# Patient Record
Sex: MALE | Race: WHITE | NOT HISPANIC OR LATINO | Employment: FULL TIME | URBAN - METROPOLITAN AREA
[De-identification: names, ages, dates, MRNs, and addresses within clinical notes are randomized per-mention and may not be internally consistent; named-entity substitution may affect disease eponyms.]

---

## 2017-10-04 ENCOUNTER — HOSPITAL ENCOUNTER (EMERGENCY)
Facility: HOSPITAL | Age: 47
Discharge: HOME | End: 2017-10-04
Attending: EMERGENCY MEDICINE
Payer: COMMERCIAL

## 2017-10-04 VITALS
RESPIRATION RATE: 19 BRPM | BODY MASS INDEX: 26.52 KG/M2 | SYSTOLIC BLOOD PRESSURE: 131 MMHG | WEIGHT: 175 LBS | HEART RATE: 93 BPM | OXYGEN SATURATION: 97 % | DIASTOLIC BLOOD PRESSURE: 92 MMHG | TEMPERATURE: 97.8 F | HEIGHT: 68 IN

## 2017-10-04 DIAGNOSIS — L50.9 URTICARIA: Primary | ICD-10-CM

## 2017-10-04 DIAGNOSIS — J06.9 VIRAL URI WITH COUGH: ICD-10-CM

## 2017-10-04 PROCEDURE — 99283 EMERGENCY DEPT VISIT LOW MDM: CPT

## 2017-10-04 RX ORDER — FAMOTIDINE 20 MG/1
20 TABLET, FILM COATED ORAL ONCE
Status: COMPLETED | OUTPATIENT
Start: 2017-10-04 | End: 2017-10-04

## 2017-10-04 RX ORDER — FAMOTIDINE 20 MG/1
20 TABLET, FILM COATED ORAL DAILY
Qty: 3 TABLET | Refills: 0 | Status: SHIPPED | OUTPATIENT
Start: 2017-10-04 | End: 2017-10-04

## 2017-10-04 RX ORDER — FAMOTIDINE 20 MG/1
20 TABLET, FILM COATED ORAL DAILY
Qty: 3 TABLET | Refills: 0 | Status: SHIPPED | OUTPATIENT
Start: 2017-10-04 | End: 2017-10-07

## 2017-10-04 RX ORDER — DIPHENHYDRAMINE HCL 25 MG
50 TABLET ORAL ONCE
Status: COMPLETED | OUTPATIENT
Start: 2017-10-04 | End: 2017-10-04

## 2017-10-04 RX ORDER — PREDNISONE 10 MG/1
50 TABLET ORAL DAILY
Qty: 20 TABLET | Refills: 0 | Status: SHIPPED | OUTPATIENT
Start: 2017-10-04 | End: 2017-10-08

## 2017-10-04 RX ADMIN — PREDNISONE 50 MG: 20 TABLET ORAL at 03:04

## 2017-10-04 RX ADMIN — DIPHENHYDRAMINE HCL 50 MG: 25 TABLET ORAL at 03:03

## 2017-10-04 RX ADMIN — FAMOTIDINE 20 MG: 20 TABLET ORAL at 03:03

## 2017-10-04 NOTE — ED ATTENDING ATTESTATION
Lauren Brown MD, saw and evaluated the patient  I have discussed the patient with the resident/non-physician practitioner and agree with the resident's/non-physician practitioner's findings, Plan of Care, and MDM as documented in the resident's/non-physician practitioner's note, except where noted  All available labs and Radiology studies were reviewed  At this point I agree with the current assessment done in the Emergency Department  I have conducted an independent evaluation of this patient including a focused history of:    Emergency Department Note- Jose Corado 40 y o  male MRN: [id number]    Unit/Bed#: [facility] 2 Encounter: [id number]    Jose Corado is a 40 y o  male who presents with   Chief Complaint   Patient presents with    Hand Swelling     Pt  reports, "Last night, I was itching and I noticed some irritation to my skin  Today, I noticed that my hands and arms are swollen  I'm not sure if it's in allergic reaction or what  I took a benadryl, but it didn't seem to help at all "  Pt  denies difficulty breathing/swelling  History of Present Illness   HPI:  Jose Corado is a 40 y o  male who presents for evaluation of:  Pruritic rash on hands and feet that started yesterday  Patient self medicated with diphenhydramine, but has had no resolution of the rash  Patient has not had any new foods, no h/o allergies  Patient had recent URI  Review of Systems   Constitutional: Negative for activity change and fever  HENT: Negative for facial swelling and trouble swallowing  Respiratory: Negative for chest tightness and shortness of breath  Cardiovascular: Negative for chest pain  Endocrine: Negative for polydipsia, polyphagia and polyuria  Musculoskeletal: Positive for joint swelling (right hand wrist)  Allergic/Immunologic: Negative for environmental allergies and food allergies  All other systems reviewed and are negative        Historical Information History reviewed  No pertinent past medical history  Past Surgical History:   Procedure Laterality Date    CYST REMOVAL       Social History   History   Alcohol Use    Yes     History   Drug Use No     History   Smoking Status    Current Every Day Smoker    Packs/day: 1 00    Types: Cigarettes   Smokeless Tobacco    Never Used     Family History: non-contributory    Meds/Allergies   all medications and allergies reviewed  Allergies   Allergen Reactions    Penicillins Other (See Comments)     Pt  Unaware of reaction  Objective   First Vitals:   Blood Pressure: 131/92 (10/04/17 0156)  Pulse: 93 (10/04/17 0156)  Temperature: 97 8 °F (36 6 °C) (10/04/17 0156)  Temp Source: Oral (10/04/17 0156)  Respirations: 19 (10/04/17 0156)  Height: 5' 8" (172 7 cm) (10/04/17 0156)  Weight - Scale: 79 4 kg (175 lb) (10/04/17 0156)  SpO2: 97 % (10/04/17 0156)    Current Vitals:   Blood Pressure: 131/92 (10/04/17 0156)  Pulse: 93 (10/04/17 0156)  Temperature: 97 8 °F (36 6 °C) (10/04/17 0156)  Temp Source: Oral (10/04/17 0156)  Respirations: 19 (10/04/17 0156)  Height: 5' 8" (172 7 cm) (10/04/17 0156)  Weight - Scale: 79 4 kg (175 lb) (10/04/17 0156)  SpO2: 97 % (10/04/17 0156)    No intake or output data in the 24 hours ending 10/04/17 0258    Invasive Devices          No matching active lines, drains, or airways          Physical Exam   Constitutional: He is oriented to person, place, and time  He appears well-developed and well-nourished  HENT:   Head: Atraumatic  Eyes: Pupils are equal, round, and reactive to light  Right conjunctiva is injected  Left conjunctiva is injected  Musculoskeletal: Normal range of motion  He exhibits no deformity  Neurological: He is alert and oriented to person, place, and time  Skin: Rash (diffuse of upper and lower extremities) noted  Psychiatric: He has a normal mood and affect   His behavior is normal  Judgment and thought content normal    Nursing note and vitals reviewed  Medical Decision Makin  Acute urticarial rash secondary to acute allergic reaction: antihistamines and corticosteroids    No results found for this or any previous visit (from the past 36 hour(s))  No orders to display         Portions of the record may have been created with voice recognition software  Occasional wrong word or "sound a like" substitutions may have occurred due to the inherent limitations of voice recognition software  Read the chart carefully and recognize, using context, where substitutions have occurred

## 2017-10-04 NOTE — ED NOTES
Per patient, "i took benadryl around 1600 yesterday for the hives/itching "     Sydnee Painter RN  10/04/17 0639

## 2017-10-04 NOTE — DISCHARGE INSTRUCTIONS
Urticaria   WHAT YOU NEED TO KNOW:   Urticaria is also called hives  Hives can change size and shape, and appear anywhere on your skin  They can be mild or severe and last from a few minutes to a few days  Hives may be a sign of a severe allergic reaction called anaphylaxis that needs immediate treatment  Urticaria that lasts longer than 6 weeks may be a chronic condition that needs long-term treatment  DISCHARGE INSTRUCTIONS:   Call 911 for signs or symptoms of anaphylaxis,  such as trouble breathing, swelling in your mouth or throat, or wheezing  You may also have itching, a rash, or feel like you are going to faint  Return to the emergency department if:   · Your heart is beating faster than it normally does  · You have cramping or severe pain in your abdomen  Contact your healthcare provider if:   · You have a fever  · Your skin still itches 24 hours after you take your medicine  · You still have hives after 7 days  · Your joints are painful and swollen  · You have questions or concerns about your condition or care  Medicines:   · Epinephrine  is used to treat severe allergic reactions such as anaphylaxis  · Antihistamines  decrease mild symptoms such as itching or a rash  · Steroids  decrease redness, pain, and swelling  · Take your medicine as directed  Contact your healthcare provider if you think your medicine is not helping or if you have side effects  Tell him of her if you are allergic to any medicine  Keep a list of the medicines, vitamins, and herbs you take  Include the amounts, and when and why you take them  Bring the list or the pill bottles to follow-up visits  Carry your medicine list with you in case of an emergency  Steps to take for signs or symptoms of anaphylaxis:   · Immediately  give 1 shot of epinephrine only into the outer thigh muscle  · Leave the shot in place  as directed   Your healthcare provider may recommend you leave it in place for up to 10 seconds before you remove it  This helps make sure all of the epinephrine is delivered  · Call 911 and go to the emergency department,  even if the shot improved symptoms  Do not drive yourself  Bring the used epinephrine shot with you  Safety precautions to take if you are at risk for anaphylaxis:   · Keep 2 shots of epinephrine with you at all times  You may need a second shot, because epinephrine only works for about 20 minutes and symptoms may return  Your healthcare provider can show you and family members how to give the shot  Check the expiration date every month and replace it before it expires  · Create an action plan  Your healthcare provider can help you create a written plan that explains the allergy and an emergency plan to treat a reaction  The plan explains when to give a second epinephrine shot if symptoms return or do not improve after the first  Give copies of the action plan and emergency instructions to family members, work and school staff, and  providers  Show them how to give a shot of epinephrine  · Be careful when you exercise  If you have had exercise-induced anaphylaxis, do not exercise right after you eat  Stop exercising right away if you start to develop any signs or symptoms of anaphylaxis  You may first feel tired, warm, or have itchy skin  Hives, swelling, and severe breathing problems may develop if you continue to exercise  · Carry medical alert identification  Wear medical alert jewelry or carry a card that explains the allergy  Ask your healthcare provider where to get these items  · Keep a record of triggers and symptoms  Record everything you eat, drink, or apply to your skin for 3 weeks  Include stressful events and what you were doing right before your hives started  Bring the record with you to follow-up visits with your healthcare provider  Manage urticaria:   · Cool your skin  This may help decrease itching  Apply a cool pack to your hives  Dip a hand towel in cool water, wring it out, and place it on your hives  You may also soak your skin in a cool oatmeal bath  · Do not rub your hives  This can irritate your skin and cause more hives  · Wear loose clothing  Tight clothes may irritate your skin and cause more hives  · Manage stress  Stress may trigger hives, or make them worse  Learn new ways to relax, such as deep breathing  Follow up with your healthcare provider as directed:  Write down your questions so you remember to ask them during your visits  © 2017 [employer] Information is for End User's use only and may not be sold, redistributed or otherwise used for commercial purposes  All illustrations and images included in CareNotes® are the copyrighted property of A [employer]  or Malik [employer]  The above information is an  only  It is not intended as medical advice for individual conditions or treatments  Talk to your doctor, nurse or pharmacist before following any medical regimen to see if it is safe and effective for you

## 2017-11-03 ENCOUNTER — HOSPITAL ENCOUNTER (EMERGENCY)
Facility: HOSPITAL | Age: 47
Discharge: HOME | End: 2017-11-03
Admitting: EMERGENCY MEDICINE
Payer: COMMERCIAL

## 2017-11-03 ENCOUNTER — APPOINTMENT (EMERGENCY)
Dept: RADIOLOGY | Facility: HOSPITAL | Age: 47
End: 2017-11-03
Payer: COMMERCIAL

## 2017-11-03 VITALS
OXYGEN SATURATION: 98 % | DIASTOLIC BLOOD PRESSURE: 72 MMHG | BODY MASS INDEX: 26.52 KG/M2 | SYSTOLIC BLOOD PRESSURE: 110 MMHG | RESPIRATION RATE: 16 BRPM | HEIGHT: 68 IN | HEART RATE: 96 BPM | WEIGHT: 175 LBS | TEMPERATURE: 98.4 F

## 2017-11-03 DIAGNOSIS — L25.9 CONTACT DERMATITIS: ICD-10-CM

## 2017-11-03 DIAGNOSIS — L50.9 URTICARIA: Primary | ICD-10-CM

## 2017-11-03 PROCEDURE — 70360 X-RAY EXAM OF NECK: CPT

## 2017-11-03 PROCEDURE — 99283 EMERGENCY DEPT VISIT LOW MDM: CPT

## 2017-11-03 RX ORDER — PREDNISONE 10 MG/1
TABLET ORAL
Qty: 42 TABLET | Refills: 0 | Status: SHIPPED | OUTPATIENT
Start: 2017-11-03

## 2017-11-03 RX ORDER — CETIRIZINE HYDROCHLORIDE 10 MG/1
10 TABLET ORAL DAILY
Qty: 21 TABLET | Refills: 0 | Status: SHIPPED | OUTPATIENT
Start: 2017-11-03

## 2017-11-03 NOTE — ED NOTES
Pt reports wife switched laundry detergent a month ago, about the time the rash began   Pt also expressed concern about black mold he came in contact with      Aliyah Mccormick  11/03/17 5874

## 2017-11-03 NOTE — ED PROVIDER NOTES
History  Chief Complaint   Patient presents with    Rash     pt c/o "hives all over body for 3 weeks and SOB "     Patient is a 18-year-old male with no significant past medical history presents emergency department for evaluation of rash  Patient states that for the past month he has been having an allergic rash  He was seen here month ago and was given 5 days worth of steroids and diagnosed with you to carry a and viral symptoms  Patient states that he has had persistent symptoms, that worsened a few days after the steroid dose was completed  Patient states that the rash is across his whole body with raised hives on his lower legs  Patient states that at nighttime he feels he has somewhat shortness of breath when he tries to sleep, but currently states that he has no difficulty breathing  No rash in his mouth  No fevers, chills, night sweats  No allergies or past anaphylaxis  Patient states that for the past month his family's been using a different clothing detergent  They have not switch back  No new other exposures like animals or other materials  Of note patient states that he does have black mold in his house  On exam patient does have a faint non raised rash across his arms and legs and slightly on his torso  There is also urticarial hives on his right posterior calf  Patient clearly uncomfortable and itching his arms and legs  Differential diagnosis to include not limited to:  Cellulitis, erysipelas, urticaria, contact dermatitis, allergic dermatitis  Patient has had this chronic rash for the past month  Patient no acute distress  Airway patent  Patient did not follow up with outpatient providers  Plan will be to give steroid taper dose and follow up with PCP/immunology    Patient states that he cannot take Benadryl as it makes him very foggy, will advise patient to start over-the-counter second generation antihistamine            Prior to Admission Medications   Prescriptions Last Dose Informant Patient Reported? Taking? Albuterol Sulfate 108 (90 Base) MCG/ACT AEPB 11/3/2017 at Unknown time  Yes Yes   Sig: Inhale 90 mcg as needed   diphenhydrAMINE (BENADRYL) 50 MG tablet   No No   Sig: Take 1 tablet by mouth every 6 (six) hours as needed for itching   famotidine (PEPCID) 20 mg tablet   No No   Sig: Take 1 tablet by mouth daily for 3 days      Facility-Administered Medications: None       History reviewed  No pertinent past medical history  Past Surgical History:   Procedure Laterality Date    CYST REMOVAL         History reviewed  No pertinent family history  I have reviewed and agree with the history as documented  Social History   Substance Use Topics    Smoking status: Current Every Day Smoker     Packs/day: 1 00     Types: Cigarettes    Smokeless tobacco: Never Used    Alcohol use Yes        Review of Systems   Constitutional: Negative for activity change, appetite change, chills, fatigue and fever  HENT: Negative for ear pain, sneezing and sore throat  Eyes: Negative for pain and visual disturbance  Respiratory: Negative for cough, choking, shortness of breath, wheezing and stridor  Cardiovascular: Negative for chest pain and palpitations  Gastrointestinal: Negative for abdominal pain, blood in stool, constipation, diarrhea, nausea and vomiting  Genitourinary: Negative for dysuria and hematuria  Musculoskeletal: Negative for arthralgias, neck pain and neck stiffness  Skin: Positive for rash  Negative for wound  Neurological: Negative for dizziness, weakness, light-headedness, numbness and headaches  All other systems reviewed and are negative        Physical Exam  ED Triage Vitals [11/03/17 1221]   Temperature Pulse Respirations Blood Pressure SpO2   98 4 °F (36 9 °C) 96 16 110/72 98 %      Temp Source Heart Rate Source Patient Position - Orthostatic VS BP Location FiO2 (%)   Oral Monitor Sitting Left arm --      Pain Score       2           Orthostatic Vital Signs  Vitals:    11/03/17 1221   BP: 110/72   Pulse: 96   Patient Position - Orthostatic VS: Sitting       Physical Exam   Constitutional: He is oriented to person, place, and time  He appears well-developed and well-nourished  No distress  Patient anxious and itching on exam   HENT:   Head: Normocephalic and atraumatic  Mouth/Throat: Oropharynx is clear and moist    Airway patent  No mucosal swelling noted  Eyes: Conjunctivae and EOM are normal  Pupils are equal, round, and reactive to light  Neck: Normal range of motion  Cardiovascular: Normal rate, regular rhythm, normal heart sounds and intact distal pulses  Exam reveals no gallop and no friction rub  No murmur heard  Pulmonary/Chest: Effort normal and breath sounds normal  No respiratory distress  Abdominal: Soft  Bowel sounds are normal  He exhibits no distension  There is no tenderness  Musculoskeletal: Normal range of motion  He exhibits no edema, tenderness or deformity  Neurological: He is alert and oriented to person, place, and time  Skin: Skin is warm  Capillary refill takes less than 2 seconds  Rash noted  He is not diaphoretic  No pallor  Faint pruritic erythematous macular rash noted across arms, legs, and torso  Urticarial lesions noted on bilateral posterior calves  Nursing note and vitals reviewed  ED Medications  Medications - No data to display    Diagnostic Studies  Results Reviewed     None                 XR neck soft tissue   ED Interpretation by Zoe Abdi PA-C (11/03 1513)   Abnormal   Possible enlargement of epiglottis       Final Result by Lan May DO (11/03 1512)   Mild degenerative changes cervical spine  Otherwise, normal plain radiographic appearance of the soft tissues of the neck           Workstation performed: [id number]                    Procedures  Procedures       Phone Contacts  ED Phone Contact    ED Course  ED Course                                MDM  Number of Diagnoses or Management Options  Contact dermatitis: new and does not require workup  Urticaria: new and does not require workup  Diagnosis management comments: ddx to include but not limited to: urticaria, anaphylaxis, contact dermatitis, allergic dermatitis,        Amount and/or Complexity of Data Reviewed  Tests in the radiology section of CPT®: ordered and reviewed  Discuss the patient with other providers: yes    Risk of Complications, Morbidity, and/or Mortality  Presenting problems: low  Diagnostic procedures: minimal  Management options: moderate    Patient Progress  Patient progress: stable    CritCare Time    Disposition  Final diagnoses:   Urticaria   Contact dermatitis     Time reflects when diagnosis was documented in both MDM as applicable and the Disposition within this note     Time User Action Codes Description Comment    11/3/2017  2:45 PM Amber Spaining Add [L50 9] Urticaria     11/3/2017  2:46 PM Davin Palacios Add [L25 9] Contact dermatitis       ED Disposition     ED Disposition Condition Comment    Discharge  [facility] discharge to home/self care  Condition at discharge: Good        Follow-up Information     Follow up With Specialties Details Why Contact Info Additional [address] [facility] DO Nicole Allergy Call in 1 day to schedule allergy follow up [facility] [address]  Call to establish care [address] [facility] Emergency Department Emergency Medicine  If symptoms worsen [address]  [phone]  ED, [address] [place] [address], [phone]        Discharge Medication List as of 11/3/2017  3:15 PM      START taking these medications    Details   cetirizine (ZyrTEC) 10 mg tablet Take 1 tablet by mouth daily, Starting Fri 11/3/2017, Print      predniSONE 10 mg tablet Take 6 pills PO daily x2 days, then 5 pills daily x2 days, then 4 pills daily x2 days, then 3 pills daily x2 days, then 1 pill daily x2 days until completion, Print         CONTINUE these medications which have NOT CHANGED    Details   Albuterol Sulfate 108 (90 Base) MCG/ACT AEPB Inhale 90 mcg as needed, Historical Med      diphenhydrAMINE (BENADRYL) 50 MG tablet Take 1 tablet by mouth every 6 (six) hours as needed for itching, Starting Wed 10/4/2017, Print      famotidine (PEPCID) 20 mg tablet Take 1 tablet by mouth daily for 3 days, Starting Wed 10/4/2017, Until Sat 10/7/2017, Print           No discharge procedures on file      ED Provider  Electronically Signed by           Manav Aguayo PA-C  11/07/17 1044

## 2017-11-03 NOTE — DISCHARGE INSTRUCTIONS
Urticaria   WHAT YOU NEED TO KNOW:   Urticaria is also called hives  Hives can change size and shape, and appear anywhere on your skin  They can be mild or severe and last from a few minutes to a few days  Hives may be a sign of a severe allergic reaction called anaphylaxis that needs immediate treatment  Urticaria that lasts longer than 6 weeks may be a chronic condition that needs long-term treatment  DISCHARGE INSTRUCTIONS:   Call 911 for signs or symptoms of anaphylaxis,  such as trouble breathing, swelling in your mouth or throat, or wheezing  You may also have itching, a rash, or feel like you are going to faint  Return to the emergency department if:   · Your heart is beating faster than it normally does  · You have cramping or severe pain in your abdomen  Contact your healthcare provider if:   · You have a fever  · Your skin still itches 24 hours after you take your medicine  · You still have hives after 7 days  · Your joints are painful and swollen  · You have questions or concerns about your condition or care  Medicines:   · Epinephrine  is used to treat severe allergic reactions such as anaphylaxis  · Antihistamines  decrease mild symptoms such as itching or a rash  · Steroids  decrease redness, pain, and swelling  · Take your medicine as directed  Contact your healthcare provider if you think your medicine is not helping or if you have side effects  Tell him of her if you are allergic to any medicine  Keep a list of the medicines, vitamins, and herbs you take  Include the amounts, and when and why you take them  Bring the list or the pill bottles to follow-up visits  Carry your medicine list with you in case of an emergency  Steps to take for signs or symptoms of anaphylaxis:   · Immediately  give 1 shot of epinephrine only into the outer thigh muscle  · Leave the shot in place  as directed   Your healthcare provider may recommend you leave it in place for up to 10 seconds before you remove it  This helps make sure all of the epinephrine is delivered  · Call 911 and go to the emergency department,  even if the shot improved symptoms  Do not drive yourself  Bring the used epinephrine shot with you  Safety precautions to take if you are at risk for anaphylaxis:   · Keep 2 shots of epinephrine with you at all times  You may need a second shot, because epinephrine only works for about 20 minutes and symptoms may return  Your healthcare provider can show you and family members how to give the shot  Check the expiration date every month and replace it before it expires  · Create an action plan  Your healthcare provider can help you create a written plan that explains the allergy and an emergency plan to treat a reaction  The plan explains when to give a second epinephrine shot if symptoms return or do not improve after the first  Give copies of the action plan and emergency instructions to family members, work and school staff, and  providers  Show them how to give a shot of epinephrine  · Be careful when you exercise  If you have had exercise-induced anaphylaxis, do not exercise right after you eat  Stop exercising right away if you start to develop any signs or symptoms of anaphylaxis  You may first feel tired, warm, or have itchy skin  Hives, swelling, and severe breathing problems may develop if you continue to exercise  · Carry medical alert identification  Wear medical alert jewelry or carry a card that explains the allergy  Ask your healthcare provider where to get these items  · Keep a record of triggers and symptoms  Record everything you eat, drink, or apply to your skin for 3 weeks  Include stressful events and what you were doing right before your hives started  Bring the record with you to follow-up visits with your healthcare provider  Manage urticaria:   · Cool your skin  This may help decrease itching   Apply a cool pack to your hives  Dip a hand towel in cool water, wring it out, and place it on your hives  You may also soak your skin in a cool oatmeal bath  · Do not rub your hives  This can irritate your skin and cause more hives  · Wear loose clothing  Tight clothes may irritate your skin and cause more hives  · Manage stress  Stress may trigger hives, or make them worse  Learn new ways to relax, such as deep breathing  Follow up with your healthcare provider as directed:  Write down your questions so you remember to ask them during your visits  © 2017 [employer] Information is for End User's use only and may not be sold, redistributed or otherwise used for commercial purposes  All illustrations and images included in CareNotes® are the copyrighted property of [employer]  or Malik Stanford  The above information is an  only  It is not intended as medical advice for individual conditions or treatments  Talk to your doctor, nurse or pharmacist before following any medical regimen to see if it is safe and effective for you  Contact Dermatitis   WHAT YOU NEED TO KNOW:   Contact dermatitis is a skin rash  It develops when you touch something that irritates your skin or causes an allergic reaction  DISCHARGE INSTRUCTIONS:   Call 911 for any of the following:   · You have sudden trouble breathing  · Your throat swells and you have trouble eating  · Your face is swollen  Contact your healthcare provider if:   · You have a fever  · Your blisters are draining pus  · Your rash spreads or does not get better, even after treatment  · You have questions or concerns about your condition or care  Medicines:   · Medicines  help decrease itching and swelling  They will be given as a topical medicine to apply to your rash or as a pill  · Take your medicine as directed    Contact your healthcare provider if you think your medicine is not helping or if you have side effects  Tell him or her if you are allergic to any medicine  Keep a list of the medicines, vitamins, and herbs you take  Include the amounts, and when and why you take them  Bring the list or the pill bottles to follow-up visits  Carry your medicine list with you in case of an emergency  Manage contact dermatitis:   · Take short baths or showers in cool water  Use mild soap or soap-free cleansers  Add oatmeal, baking soda, or cornstarch to the bath water to help decrease skin irritation  · Avoid skin irritants , such as makeup, hair products, soaps, and cleansers  Use products that do not contain perfume or dye  · Apply a cool compress to your rash  This will help soothe your skin  · Keep your skin moist   Rub unscented cream or lotion on your skin to prevent dryness and itching  Do this right after a bath or shower when your skin is still damp  Follow up with your healthcare provider or dermatologist in 2 to 3 days:  Write down your questions so you remember to ask them during your visits  © 2017 [employer] Information is for End User's use only and may not be sold, redistributed or otherwise used for commercial purposes  All illustrations and images included in CareNotes® are the copyrighted property of [employer]  or Malik Stanford  The above information is an  only  It is not intended as medical advice for individual conditions or treatments  Talk to your doctor, nurse or pharmacist before following any medical regimen to see if it is safe and effective for you

## 2019-12-11 ENCOUNTER — CONSULT (OUTPATIENT)
Dept: SURGERY | Facility: CLINIC | Age: 49
End: 2019-12-11
Payer: COMMERCIAL

## 2019-12-11 VITALS
DIASTOLIC BLOOD PRESSURE: 92 MMHG | SYSTOLIC BLOOD PRESSURE: 148 MMHG | HEIGHT: 68 IN | WEIGHT: 183.6 LBS | BODY MASS INDEX: 27.83 KG/M2 | HEART RATE: 52 BPM | TEMPERATURE: 98.1 F

## 2019-12-11 DIAGNOSIS — K42.9 UMBILICAL HERNIA WITHOUT OBSTRUCTION AND WITHOUT GANGRENE: Primary | ICD-10-CM

## 2019-12-11 PROCEDURE — 99204 OFFICE O/P NEW MOD 45 MIN: CPT | Performed by: SURGERY

## 2019-12-11 NOTE — PROGRESS NOTES
Office Visit - General Surgery  Milka Matos MRN: [id number]  Encounter: [id number]    Assessment and Plan    Problem List Items Addressed This Visit        Other    Umbilical hernia without obstruction and without gangrene - Primary     I discussed what a hernia is with him and I discussed how this would be repaired  I discussed the procedure in detail including risks, benefits, alternatives, and what to expect postoperatively  He understands and is agreeable to go ahead  Plan:  Umbilical hernia repair               Chief Complaint:  Milka Matos is a 51 y o  male who presents for Hernia (Consult umbilical hernia)    Subjective  81-year-old white male referred for evaluation of umbilical hernia  He has had this for 6 or 7 years  He notices that it is getting somewhat larger  He notices that he has pain sometimes goes around towards his back or just around that area when he is lifting  No change in bowel movements  No other complaints problems at this time    Past Medical History  History reviewed  No pertinent past medical history  Past Surgical History  Past Surgical History:   Procedure Laterality Date    CYST REMOVAL         Family History  Family History   Problem Relation Age of Onset    Hypertension Mother     Hyperlipidemia Mother     Lung cancer Father        Social History  Social History     Socioeconomic History    Marital status: /Civil Union     Spouse name: None    Number of children: None    Years of education: None    Highest education level: None   Occupational History    None   Social Needs    Financial resource strain: None    Food insecurity:     Worry: None     Inability: None    Transportation needs:     Medical: None     Non-medical: None   Tobacco Use    Smoking status: Current Every Day Smoker     Packs/day: 1 00     Types: Cigarettes    Smokeless tobacco: Never Used   Substance and Sexual Activity    Alcohol use:  Yes    Drug use: No    Sexual activity: None   Lifestyle    Physical activity:     Days per week: None     Minutes per session: None    Stress: None   Relationships    Social connections:     Talks on phone: None     Gets together: None     Attends [employer] service: None     Active member of club or organization: None     Attends meetings of clubs or organizations: None     Relationship status: None    Intimate partner violence:     Fear of current or ex partner: None     Emotionally abused: None     Physically abused: None     Forced sexual activity: None   Other Topics Concern    None   Social History Narrative    None        Medications  Current Outpatient Medications on File Prior to Visit   Medication Sig Dispense Refill    Albuterol Sulfate 108 (90 Base) MCG/ACT AEPB Inhale 90 mcg as needed      cetirizine (ZyrTEC) 10 mg tablet Take 1 tablet by mouth daily (Patient not taking: Reported on 12/11/2019) 21 tablet 0    diphenhydrAMINE (BENADRYL) 50 MG tablet Take 1 tablet by mouth every 6 (six) hours as needed for itching (Patient not taking: Reported on 12/11/2019) 30 tablet 0    famotidine (PEPCID) 20 mg tablet Take 1 tablet by mouth daily for 3 days 3 tablet 0    predniSONE 10 mg tablet Take 6 pills PO daily x2 days, then 5 pills daily x2 days, then 4 pills daily x2 days, then 3 pills daily x2 days, then 1 pill daily x2 days until completion (Patient not taking: Reported on 12/11/2019) 42 tablet 0     No current facility-administered medications on file prior to visit  Allergies  Allergies   Allergen Reactions    Penicillins Other (See Comments)     Pt  Unaware of reaction  Review of Systems   Constitutional: Negative for chills, fatigue and fever  HENT: Negative for congestion, ear pain, sneezing, trouble swallowing and voice change  Eyes: Negative for pain and discharge  Respiratory: Negative for cough, shortness of breath and wheezing  Cardiovascular: Negative for palpitations and leg swelling  Gastrointestinal: Positive for abdominal pain  Negative for constipation, diarrhea, nausea and vomiting  Endocrine: Negative for cold intolerance, heat intolerance and polyuria  Genitourinary: Negative for decreased urine volume, difficulty urinating and dysuria  Musculoskeletal: Negative for arthralgias and back pain  Skin: Negative for rash and wound  Allergic/Immunologic: Negative for environmental allergies and food allergies  Neurological: Negative for dizziness and weakness  Hematological: Negative for adenopathy  Does not bruise/bleed easily  Psychiatric/Behavioral: Negative for confusion and sleep disturbance  The patient is not nervous/anxious  All other systems reviewed and are negative  Objective  Vitals:    12/11/19 1812   BP: 148/92   Pulse: (!) 52   Temp: 98 1 °F (36 7 °C)       Physical Exam   Constitutional: He is oriented to person, place, and time  He appears well-developed and well-nourished  No distress  HENT:   Head: Normocephalic and atraumatic  Right Ear: External ear normal    Left Ear: External ear normal    Eyes: Conjunctivae are normal  No scleral icterus  Neck: Normal range of motion  Neck supple  No tracheal deviation present  No thyromegaly present  Cardiovascular: Normal rate, regular rhythm and normal heart sounds  No murmur heard  Pulmonary/Chest: Effort normal and breath sounds normal  No respiratory distress  He has no wheezes  He has no rales  Abdominal: Soft  He exhibits no distension and no mass  There is no tenderness  There is no rebound and no guarding  A hernia is present  Reducible umbilical hernia with about 1 5 to 2 cm defect in the fascia   Musculoskeletal: Normal range of motion  He exhibits no edema  Lymphadenopathy:     He has no cervical adenopathy  Neurological: He is alert and oriented to person, place, and time  Skin: Skin is warm and dry  He is not diaphoretic  Psychiatric: He has a normal mood and affect   His behavior is normal  Judgment and thought content normal    Nursing note and vitals reviewed

## 2019-12-12 PROBLEM — K42.9 UMBILICAL HERNIA: Status: ACTIVE | Noted: 2019-12-12

## 2019-12-12 NOTE — ASSESSMENT & PLAN NOTE
I discussed what a hernia is with him and I discussed how this would be repaired  I discussed the procedure in detail including risks, benefits, alternatives, and what to expect postoperatively  He understands and is agreeable to go ahead      Plan:  Umbilical hernia repair